# Patient Record
Sex: FEMALE | Race: BLACK OR AFRICAN AMERICAN | URBAN - METROPOLITAN AREA
[De-identification: names, ages, dates, MRNs, and addresses within clinical notes are randomized per-mention and may not be internally consistent; named-entity substitution may affect disease eponyms.]

---

## 2017-01-01 ENCOUNTER — TELEPHONE (OUTPATIENT)
Dept: INTERNAL MEDICINE CLINIC | Age: 82
End: 2017-01-01

## 2017-01-01 ENCOUNTER — DOCUMENTATION ONLY (OUTPATIENT)
Dept: INTERNAL MEDICINE CLINIC | Age: 82
End: 2017-01-01

## 2017-03-07 NOTE — TELEPHONE ENCOUNTER
Patient's daughter called requesting medication for her mother's UTI. States the hospice workers told her it will take 3 days to do a culture before they can treat her. Ms. Fadia Conway states she does not want her to have to wait that long since she is in pain, asking that you please call some medication in.   Ms. Fadia Conway would like a call back so she knows what is happening. (not listed on PHI, only Anita Acosta is listed)

## 2017-03-08 NOTE — TELEPHONE ENCOUNTER
U/A sent to [facility] yesterday by [facility] did have some 10-20 WBC but erwin est and nitrates were negative. CX pending. It was, however, very concentrated like she is dehydrated.

## 2017-03-08 NOTE — PROGRESS NOTES
U/A sent to Luis yesterday by [facility] did have some 10-20 WBC but erwin est and nitrates were negative. CX pending. It was, however, very concentrated like she is dehydrated.

## 2017-03-08 NOTE — TELEPHONE ENCOUNTER
I do not know that she needs an antibiotic until the culture is back.   I have heard nothing about pain nor about a possible UTI

## 2021-11-16 NOTE — TELEPHONE ENCOUNTER
Faxed to WHERE?  There was no info on the form
Patient's daughter Stevan Campos calling to check the status of the [facility] papers that were faxed last week.   She can be reached at [phone]
Spoke with patient's daughter, advised her that [facility] paperwork is finished and ready for pick-up. Daughter requested that paperwork be faxed. I agreed, but advised that she come  the original for her records.      Be advised
Will discuss with daughter when she comes to  paperwork
No